# Patient Record
(demographics unavailable — no encounter records)

---

## 2025-04-01 NOTE — ASSESSMENT
[Patient Optimized for Surgery] : Patient optimized for surgery [No Further Testing Recommended] : no further testing recommended [Continue medications as is] : Continue current medications [FreeTextEntry4] : Cleared for Surgery

## 2025-04-01 NOTE — HISTORY OF PRESENT ILLNESS
[COPD] : COPD [Aortic Stenosis] : no aortic stenosis [Atrial Fibrillation] : no atrial fibrillation [Coronary Artery Disease] : no coronary artery disease [Recent Myocardial Infarction] : no recent myocardial infarction [Implantable Device/Pacemaker] : no implantable device/pacemaker [Asthma] : no asthma [Sleep Apnea] : no sleep apnea [Smoker] : not a smoker [Chronic Anticoagulation] : no chronic anticoagulation [Chronic Kidney Disease] : no chronic kidney disease [FreeTextEntry1] : Cystoscopy TURBT [FreeTextEntry2] : 4/10/25 [FreeTextEntry3] : Dr. Wylie [FreeTextEntry4] : GENNARO JACOB is a 63 year old F who presents today for medical clearance

## 2025-07-21 NOTE — PLAN
[FreeTextEntry1] : Osteopenia discussed.  Continue weightbearing exercise vitamin D and multivitamin. I have spent 40 minutes of time on this encounter.  Greater than 50% of the face-to-face encounter time was spent on counseling and/or coordination of care for examination findings, differential, testing, management and planning. Ten minutes were allotted to review and discuss the depression screening. The patient received extensive counseling in regards to her increased cancer risk.  Patient with a strong family history and the benefits of BRCA/MyRisk testing were discussed at length and all questions were answered.  A brochure was given and testing recommended. 1)  Self breast exam instructions, calcium supplementation discussed with the patient. 2)  Mammography, lipid profile assessment, TSH screening, fasting glucose testing, colonoscopy screening was discussed with the patient. Vitamin D supplementation 3)  Maintain healthy weight. 4)  Regular health maintenance with PCP. 5)  Remain tobacco free. 6)  Limit alcohol intake to less than 5 drinks per week. 7)  Osteoporosis screening. 8)  Annual cholesterol screening. 9)  Annual influenza vaccine. The importance of routine physical activity was reviewed and a goal of 150 minutes of moderately vigorous exercise per week was endorsed.  Blood drawn for thyroid testing at patient's request

## 2025-07-21 NOTE — HISTORY OF PRESENT ILLNESS
[LMP unknown] : LMP unknown [postmenopausal] : postmenopausal [N] : Patient is not sexually active [Y] : Positive pregnancy history [No] : Patient does not have concerns regarding sex [Previously active] : previously active [Men] : men [FreeTextEntry1] : The 64 year-old patient presents today for a routine GYN exam. She offers no complaints. We reviewed together in detail her past medical and surgical histories, allergies and medication usage, social and family history. All questions were answered in easy-to-understand language. Recently treated for superficial bladder carcinoma.  Pathology noted. [Mammogramdate] : 03/25/2024 [BreastSonogramDate] : 02/15/2023 [PapSmeardate] : 02/28/2024 [BoneDensityDate] : 03/25/2024 [ColonoscopyDate] : NA [TextBox_78] : No history of HPV [PGxTotal] : 1 [[facility]] : 1 [FreeTextEntry2] : Not at this time

## 2025-07-21 NOTE — PHYSICAL EXAM
[Appropriately responsive] : appropriately responsive [Alert] : alert [No Acute Distress] : no acute distress [No Lymphadenopathy] : no lymphadenopathy [Soft] : soft [Non-tender] : non-tender [Non-distended] : non-distended [No HSM] : No HSM [No Lesions] : no lesions [No Mass] : no mass [Oriented x3] : oriented x3 [Examination Of The Breasts] : a normal appearance [No Masses] : no breast masses were palpable [Labia Majora] : normal [Labia Minora] : normal [Uterine Adnexae] : normal [Normal rectal exam] : was normal [Normal Brown Stool] : was normal and brown [Normal] : was normal [None] : there was no rectal mass  [FreeTextEntry6] : Examined supine and sitting [Occult Blood Positive] : was negative for occult blood analysis [Internal Hemorrhoid] : no internal hemorrhoids were present [External Hemorrhoid] : no external hemorrhoids were present [Skin Tags] : no residual hemorrhoidal skin tags [FreeTextEntry8] : Normal examination